# Patient Record
Sex: MALE | Race: WHITE | NOT HISPANIC OR LATINO | URBAN - METROPOLITAN AREA
[De-identification: names, ages, dates, MRNs, and addresses within clinical notes are randomized per-mention and may not be internally consistent; named-entity substitution may affect disease eponyms.]

---

## 2023-10-10 ENCOUNTER — OFFICE VISIT (OUTPATIENT)
Dept: PRIMARY CARE | Facility: CLINIC | Age: 53
End: 2023-10-10
Payer: COMMERCIAL

## 2023-10-10 VITALS
OXYGEN SATURATION: 97 % | HEIGHT: 72 IN | BODY MASS INDEX: 26.28 KG/M2 | DIASTOLIC BLOOD PRESSURE: 80 MMHG | WEIGHT: 194 LBS | HEART RATE: 78 BPM | SYSTOLIC BLOOD PRESSURE: 120 MMHG

## 2023-10-10 DIAGNOSIS — M25.561 CHRONIC PAIN OF RIGHT KNEE: ICD-10-CM

## 2023-10-10 DIAGNOSIS — G89.29 CHRONIC PAIN OF RIGHT KNEE: ICD-10-CM

## 2023-10-10 DIAGNOSIS — Z01.818 PRE-OP EXAMINATION: Primary | ICD-10-CM

## 2023-10-10 DIAGNOSIS — L40.50 ARTHROPATHIC PSORIASIS, UNSPECIFIED (MULTI): ICD-10-CM

## 2023-10-10 PROBLEM — L40.9 PSORIASIS: Status: ACTIVE | Noted: 2023-10-10

## 2023-10-10 PROBLEM — J45.40 MODERATE PERSISTENT ASTHMA WITHOUT COMPLICATION (HHS-HCC): Status: ACTIVE | Noted: 2023-10-10

## 2023-10-10 PROBLEM — G47.33 OBSTRUCTIVE SLEEP APNEA: Status: ACTIVE | Noted: 2023-10-10

## 2023-10-10 PROBLEM — E78.2 HYPERLIPIDEMIA, MIXED: Status: ACTIVE | Noted: 2023-10-10

## 2023-10-10 LAB
NON-UH HIE APPEARANCE, U: CLEAR
NON-UH HIE APTT PATIENT: 35.4 SECONDS (ref 27–38)
NON-UH HIE BILIRUBIN, U: NEGATIVE
NON-UH HIE BLOOD, U: NEGATIVE
NON-UH HIE BUN/CREAT RATIO: 14
NON-UH HIE BUN: 14 MG/DL (ref 9–23)
NON-UH HIE CALCIUM: 9.5 MG/DL (ref 8.7–10.4)
NON-UH HIE CALCULATED OSMOLALITY: 280 MOSM/KG (ref 275–295)
NON-UH HIE CHLORIDE: 102 MMOL/L (ref 98–107)
NON-UH HIE CO2, VENOUS: 32 MMOL/L (ref 20–31)
NON-UH HIE COLOR, U: YELLOW
NON-UH HIE CREATININE: 1 MG/DL (ref 0.6–1.1)
NON-UH HIE GFR AA: >60
NON-UH HIE GLOMERULAR FILTRATION RATE: >60 ML/MIN/1.73M?
NON-UH HIE GLUCOSE QUAL, U: NEGATIVE
NON-UH HIE GLUCOSE: 92 MG/DL (ref 74–106)
NON-UH HIE HCT: 42.3 % (ref 41–52)
NON-UH HIE HGB: 14.3 G/DL (ref 13.5–17.5)
NON-UH HIE INR: 1.1
NON-UH HIE INSTR WBC ND: 8
NON-UH HIE K: 4.5 MMOL/L (ref 3.5–5.1)
NON-UH HIE KETONES, U: NEGATIVE
NON-UH HIE LEUKOCYTE ESTERASE, U: NEGATIVE
NON-UH HIE MCH: 31 PG (ref 27–34)
NON-UH HIE MCHC: 33.8 G/DL (ref 32–37)
NON-UH HIE MCV: 91.8 FL (ref 80–100)
NON-UH HIE MPV: 7.5 FL (ref 7.4–10.4)
NON-UH HIE NA: 140 MMOL/L (ref 135–145)
NON-UH HIE NITRITE, U: NEGATIVE
NON-UH HIE PH, U: 5 (ref 4.5–8)
NON-UH HIE PLATELET: 286 X10 (ref 150–450)
NON-UH HIE PROTEIN, U: NEGATIVE
NON-UH HIE PROTIME PATIENT: 11.9 SECONDS (ref 9.8–12.8)
NON-UH HIE RBC: 4.61 X10 (ref 4.7–6.1)
NON-UH HIE RDW: 12.6 % (ref 11.5–14.5)
NON-UH HIE SPECIFIC GRAVITY, U: 1.02 (ref 1–1.03)
NON-UH HIE SQUAMOUS EPITHELIAL CELLS, U: 1 #/HPF
NON-UH HIE U MICRO: NORMAL
NON-UH HIE UROBILINOGEN QUAL, U: NORMAL
NON-UH HIE WBC: 8 X10 (ref 4.5–11)

## 2023-10-10 PROCEDURE — 93000 ELECTROCARDIOGRAM COMPLETE: CPT | Performed by: FAMILY MEDICINE

## 2023-10-10 PROCEDURE — 1036F TOBACCO NON-USER: CPT | Performed by: FAMILY MEDICINE

## 2023-10-10 PROCEDURE — 99214 OFFICE O/P EST MOD 30 MIN: CPT | Performed by: FAMILY MEDICINE

## 2023-10-10 RX ORDER — HYDROCORTISONE 25 MG/ML
LOTION TOPICAL 2 TIMES DAILY
COMMUNITY
Start: 2019-04-05

## 2023-10-10 RX ORDER — FLUTICASONE PROPIONATE 93 UG/1
SPRAY, METERED NASAL
COMMUNITY

## 2023-10-10 RX ORDER — ALBUTEROL SULFATE 90 UG/1
AEROSOL, METERED RESPIRATORY (INHALATION) EVERY 6 HOURS
COMMUNITY
Start: 2019-01-08

## 2023-10-10 RX ORDER — PANTOPRAZOLE SODIUM 40 MG/1
TABLET, DELAYED RELEASE ORAL EVERY 12 HOURS
COMMUNITY
Start: 2023-10-03

## 2023-10-10 RX ORDER — EPINEPHRINE 0.3 MG/.3ML
INJECTION SUBCUTANEOUS
COMMUNITY

## 2023-10-10 RX ORDER — FLUTICASONE FUROATE AND VILANTEROL TRIFENATATE 100; 25 UG/1; UG/1
POWDER RESPIRATORY (INHALATION)
COMMUNITY
Start: 2019-01-08

## 2023-10-10 RX ORDER — OMALIZUMAB 150 MG/ML
INJECTION, SOLUTION SUBCUTANEOUS
COMMUNITY
Start: 2022-03-01

## 2023-10-10 RX ORDER — RISANKIZUMAB-RZAA 150 MG/ML
INJECTION SUBCUTANEOUS
COMMUNITY
Start: 2022-03-01

## 2023-10-10 RX ORDER — FEXOFENADINE HCL AND PSEUDOEPHEDRINE HCI 60; 120 MG/1; MG/1
1 TABLET, EXTENDED RELEASE ORAL 2 TIMES DAILY
COMMUNITY
Start: 2021-02-18

## 2023-10-10 NOTE — PROGRESS NOTES
Subjective   Patient ID: Kyler Chowdhury is a 53 y.o. male who presents for Pre-op Exam (Pt surgery is right total knee replacement ).    Assessment/Plan     Problem List Items Addressed This Visit       Arthropathic psoriasis, unspecified (CMS/HCC)     Stable on biologic          Other Visit Diagnoses       Pre-op examination    -  Primary    Relevant Orders    CBC    Basic Metabolic Panel    Protime-INR    aPTT    Type and screen    Prepare RBC: 2 Units    ECG 12 lead (Clinic Performed) (Completed)    Urinalysis with Reflex Microscopic    Chronic pain of right knee        Relevant Orders    CBC    Basic Metabolic Panel    Protime-INR    aPTT    Type and screen    Prepare RBC: 2 Units    ECG 12 lead (Clinic Performed) (Completed)        Discontinue anti-inflammatories 7 days before surgery   Continue rest of the medication   EKG today   Patient is at low risk for cardiac complication from noncardiac surgery.  Lab work today  We will clear patient for surgery after reviewing lab work      Previous visit  Discussed about diet exercise  Discussed about tetanus vaccine  Information provided for colonoscopy  Discussed about shingles vaccine  Coronary calcium score requisition provided      HPI  53-year-old male here for preop exam right total knee replacement for osteoarthritis will be done by Dr. Thayer on 10/25    Patient has a family history of DVTs in his father.  Patient recently had a right knee arthrocentesis done by Dr. Cespedes and received steroid injection couple of weeks ago     Hyperlipidemia -diet controlled  Pulmonary function showed small artery disease with significant improvement with bronchodilator and increased nitric oxide level in exhalation suggestive of eosinophilic asthma following up with pulmonary  Treadmill stress test - within normal limits        Patient does have a history of smoking quit in January 2018 and smoked three quarters of pack every day for last 20 years     EGD in June 2018 done  Showed a hiatal hernia with reflux esophagitis with 1.5 cm linear distal esophageal ulcer -   Stopped smoking and drinks alcohol  Lifts heavy weight     Family history is positive for coronary artery disease grandfather around age of 50     Following up with allergy - on xolair and pulmonary for asthma    No Known Allergies    Current Outpatient Medications   Medication Sig Dispense Refill    albuterol (Ventolin HFA) 90 mcg/actuation inhaler every 6 hours.      Breo Ellipta 100-25 mcg/dose inhaler Inhale.      EPINEPHrine 0.3 mg/0.3 mL injection syringe USE AS DIRECTED.      fexofenadine-pseudoephedrine (Allegra-D 12 Hour)  mg 12 hr tablet Take 1 tablet by mouth 2 times a day.      hydrocortisone 2.5 % lotion twice a day.      omalizumab (Xolair) 150 mg/mL injection Inject under the skin.      pantoprazole (ProtoNix) 40 mg EC tablet every 12 hours.      Skyrizi 150 mg/mL pen injector pen Inject under the skin.      Xhance 93 mcg/actuation aerosol breath activated 1 SPRAY(S) 2 TIMES A DAY NASAL       No current facility-administered medications for this visit.       Objective   Visit Vitals  /80 (BP Location: Left arm, Patient Position: Sitting)   Pulse 78   Ht 1.829 m (6')   Wt 88 kg (194 lb)   SpO2 97%   BMI 26.31 kg/m²   Smoking Status Former   BSA 2.11 m²     Physical Exam  Constitutional:       General: He is not in acute distress.     Appearance: Normal appearance.   HENT:      Head: Normocephalic and atraumatic.      Nose: Nose normal.   Eyes:      Extraocular Movements: Extraocular movements intact.      Conjunctiva/sclera: Conjunctivae normal.   Cardiovascular:      Rate and Rhythm: Normal rate and regular rhythm.   Pulmonary:      Effort: Pulmonary effort is normal.      Breath sounds: Normal breath sounds.   Skin:     General: Skin is warm.   Neurological:      Mental Status: He is alert and oriented to person, place, and time.   Psychiatric:         Mood and Affect: Mood normal.          Behavior: Behavior normal.   Immunization History   Administered Date(s) Administered    Pfizer Purple Cap SARS-CoV-2 03/26/2021, 04/16/2021, 12/30/2021    Zoster vaccine, recombinant, adult (SHINGRIX) 04/18/2023, 09/11/2023       Review of Systems    No visits with results within 4 Month(s) from this visit.   Latest known visit with results is:   Legacy Encounter on 10/11/2022   Component Date Value Ref Range Status    Cholesterol 10/11/2022 200 (H)  0 - 199 mg/dL Final    HDL 10/11/2022 82.4  mg/dL Final    Cholesterol/HDL Ratio 10/11/2022 2.4   Final    LDL 10/11/2022 105 (H)  0 - 99 mg/dL Final    VLDL 10/11/2022 13  0 - 40 mg/dL Final    Triglycerides 10/11/2022 63  0 - 149 mg/dL Final    Color, Urine 10/11/2022 YELLOW  STRAW,YELLOW Final    Appearance, Urine 10/11/2022 CLEAR  CLEAR Final    Specific Gravity, Urine 10/11/2022 1.024  1.005 - 1.035 Final    pH, Urine 10/11/2022 5.0  5.0 - 8.0 Final    Protein, Urine 10/11/2022 NEGATIVE  NEGATIVE mg/dL Final    Glucose, Urine 10/11/2022 NEGATIVE  NEGATIVE mg/dL Final    Blood, Urine 10/11/2022 NEGATIVE  NEGATIVE Final    Ketones, Urine 10/11/2022 NEGATIVE  NEGATIVE mg/dL Final    Bilirubin, Urine 10/11/2022 NEGATIVE  NEGATIVE Final    Urobilinogen, Urine 10/11/2022 <2.0  0.0 - 1.9 mg/dL Final    Nitrite, Urine 10/11/2022 NEGATIVE  NEGATIVE Final    Leukocyte Esterase, Urine 10/11/2022 NEGATIVE  NEGATIVE Final    WBC 10/11/2022 6.5  4.4 - 11.3 x10E9/L Final    nRBC 10/11/2022 0.0  0.0 - 0.0 /100 WBC Final    RBC 10/11/2022 4.45 (L)  4.50 - 5.90 x10E12/L Final    Hemoglobin 10/11/2022 13.8  13.5 - 17.5 g/dL Final    Hematocrit 10/11/2022 42.3  41.0 - 52.0 % Final    MCV 10/11/2022 95  80 - 100 fL Final    MCHC 10/11/2022 32.6  32.0 - 36.0 g/dL Final    Platelets 10/11/2022 251  150 - 450 x10E9/L Final    RDW 10/11/2022 12.5  11.5 - 14.5 % Final    Prostate Specific Antigen,Screen 10/11/2022 1.82  0.00 - 4.00 ng/mL Final    Glucose 10/11/2022 78  74 - 99 mg/dL Final     Sodium 10/11/2022 141  136 - 145 mmol/L Final    Potassium 10/11/2022 3.9  3.5 - 5.3 mmol/L Final    Chloride 10/11/2022 103  98 - 107 mmol/L Final    Bicarbonate 10/11/2022 24  21 - 32 mmol/L Final    Anion Gap 10/11/2022 18  10 - 20 mmol/L Final    Urea Nitrogen 10/11/2022 20  6 - 23 mg/dL Final    Creatinine 10/11/2022 1.01  0.50 - 1.30 mg/dL Final    GFR MALE 10/11/2022 >90  >90 mL/min/1.73m2 Final    Calcium 10/11/2022 9.6  8.6 - 10.6 mg/dL Final    Albumin 10/11/2022 4.5  3.4 - 5.0 g/dL Final    Alkaline Phosphatase 10/11/2022 51  33 - 120 U/L Final    Total Protein 10/11/2022 7.1  6.4 - 8.2 g/dL Final    AST 10/11/2022 21  9 - 39 U/L Final    Total Bilirubin 10/11/2022 0.7  0.0 - 1.2 mg/dL Final    ALT (SGPT) 10/11/2022 18  10 - 52 U/L Final    TSH 10/11/2022 1.20  0.44 - 3.98 mIU/L Final       Radiology: Reviewed imaging in powerchart.  No results found.    No family history on file.  Social History     Socioeconomic History    Marital status:      Spouse name: None    Number of children: None    Years of education: None    Highest education level: None   Occupational History    None   Tobacco Use    Smoking status: Former     Packs/day: 1.00     Years: 20.00     Additional pack years: 0.00     Total pack years: 20.00     Types: Cigarettes    Smokeless tobacco: Never   Substance and Sexual Activity    Alcohol use: Yes     Alcohol/week: 6.0 standard drinks of alcohol     Types: 6 Cans of beer per week    Drug use: Never    Sexual activity: None   Other Topics Concern    None   Social History Narrative    None     Social Determinants of Health     Financial Resource Strain: Not on file   Food Insecurity: Not on file   Transportation Needs: Not on file   Physical Activity: Not on file   Stress: Not on file   Social Connections: Not on file   Intimate Partner Violence: Not on file   Housing Stability: Not on file     Past Medical History:   Diagnosis Date    Personal history of other diseases of the  circulatory system     History of cardiac murmur     Past Surgical History:   Procedure Laterality Date    KNEE SURGERY  08/01/2017    Knee Surgery Left    KNEE SURGERY  08/01/2017    Knee Surgery Right    VASECTOMY  08/01/2017    Surgery Vas Deferens Vasectomy       Charting was completed using voice recognition technology and may include unintended errors.

## 2023-10-17 ENCOUNTER — OFFICE VISIT (OUTPATIENT)
Dept: PRIMARY CARE | Facility: CLINIC | Age: 53
End: 2023-10-17
Payer: COMMERCIAL

## 2023-10-17 ENCOUNTER — LAB (OUTPATIENT)
Dept: LAB | Facility: LAB | Age: 53
End: 2023-10-17
Payer: COMMERCIAL

## 2023-10-17 VITALS
WEIGHT: 195 LBS | DIASTOLIC BLOOD PRESSURE: 78 MMHG | TEMPERATURE: 97.2 F | HEIGHT: 72 IN | OXYGEN SATURATION: 98 % | SYSTOLIC BLOOD PRESSURE: 115 MMHG | HEART RATE: 69 BPM | BODY MASS INDEX: 26.41 KG/M2

## 2023-10-17 DIAGNOSIS — Z00.00 VISIT FOR PREVENTIVE HEALTH EXAMINATION: ICD-10-CM

## 2023-10-17 DIAGNOSIS — Z00.00 VISIT FOR PREVENTIVE HEALTH EXAMINATION: Primary | ICD-10-CM

## 2023-10-17 LAB
ALBUMIN SERPL BCP-MCNC: 4.4 G/DL (ref 3.4–5)
ALP SERPL-CCNC: 56 U/L (ref 33–120)
ALT SERPL W P-5'-P-CCNC: 17 U/L (ref 10–52)
AST SERPL W P-5'-P-CCNC: 16 U/L (ref 9–39)
BILIRUB DIRECT SERPL-MCNC: 0.1 MG/DL (ref 0–0.3)
BILIRUB SERPL-MCNC: 0.4 MG/DL (ref 0–1.2)
CHOLEST SERPL-MCNC: 210 MG/DL (ref 0–199)
CHOLESTEROL/HDL RATIO: 3.8
HDLC SERPL-MCNC: 55.2 MG/DL
LDLC SERPL CALC-MCNC: 134 MG/DL
NON HDL CHOLESTEROL: 155 MG/DL (ref 0–149)
PROT SERPL-MCNC: 7.2 G/DL (ref 6.4–8.2)
PSA SERPL-MCNC: 1.94 NG/ML
TRIGL SERPL-MCNC: 104 MG/DL (ref 0–149)
TSH SERPL-ACNC: 2.69 MIU/L (ref 0.44–3.98)
VLDL: 21 MG/DL (ref 0–40)

## 2023-10-17 PROCEDURE — 80061 LIPID PANEL: CPT

## 2023-10-17 PROCEDURE — 90682 RIV4 VACC RECOMBINANT DNA IM: CPT | Performed by: FAMILY MEDICINE

## 2023-10-17 PROCEDURE — 36415 COLL VENOUS BLD VENIPUNCTURE: CPT

## 2023-10-17 PROCEDURE — 1036F TOBACCO NON-USER: CPT | Performed by: FAMILY MEDICINE

## 2023-10-17 PROCEDURE — 99396 PREV VISIT EST AGE 40-64: CPT | Performed by: FAMILY MEDICINE

## 2023-10-17 PROCEDURE — 90471 IMMUNIZATION ADMIN: CPT | Performed by: FAMILY MEDICINE

## 2023-10-17 PROCEDURE — 84153 ASSAY OF PSA TOTAL: CPT

## 2023-10-17 PROCEDURE — 80076 HEPATIC FUNCTION PANEL: CPT

## 2023-10-17 PROCEDURE — 84443 ASSAY THYROID STIM HORMONE: CPT

## 2023-10-17 NOTE — PROGRESS NOTES
Subjective   Patient ID: Kyler Chowdhury is a 53 y.o. male who presents for Annual Exam.    Assessment/Plan     Problem List Items Addressed This Visit    None  Visit Diagnoses       Visit for preventive health examination    -  Primary    Relevant Orders    Flu vaccine, quadrivalent, recombinant, preservative free, adult (FLUBLOK)    Prostate Specific Antigen, Screen    TSH with reflex to Free T4 if abnormal    Lipid Panel    Hepatic function panel          Discussed about diet exercise  Discussed about tetanus vaccine  Colonoscopy done- 1/23  Received Shingrix received flu vaccine  Coronary calcium score-16.88 in 12/22  Follow-up in 6 to 12 months  Come back early with any new signs and symptoms  Patient understands and agreement with plan.    HPI  53-year-old male here for physical     Patient will be having right total knee replacement for osteoarthritis will be done by Dr. Thayer on 10/25    Patient has a family history of DVTs in his father.  Patient recently had a right knee arthrocentesis done by Dr. Cespedes and received steroid injection couple of weeks ago     Hyperlipidemia -diet controlled  Pulmonary function showed small artery disease with significant improvement with bronchodilator and increased nitric oxide level in exhalation suggestive of eosinophilic asthma following up with pulmonary  Treadmill stress test - within normal limits        Patient does have a history of smoking quit in January 2018 and smoked three quarters of pack every day for last 20 years     EGD in June 2018 done Showed a hiatal hernia with reflux esophagitis with 1.5 cm linear distal esophageal ulcer - taking PPI as needed visits  Stopped smoking 6 years ago and drinks alcohol  Patient was a rare smoker  Lifts heavy weight     Family history is positive for coronary artery disease grandfather around age of 50     Following up with allergy - on xolair and pulmonary for asthma        No Known Allergies    Current Outpatient  Medications   Medication Sig Dispense Refill    albuterol (Ventolin HFA) 90 mcg/actuation inhaler every 6 hours.      Breo Ellipta 100-25 mcg/dose inhaler Inhale.      EPINEPHrine 0.3 mg/0.3 mL injection syringe USE AS DIRECTED.      fexofenadine-pseudoephedrine (Allegra-D 12 Hour)  mg 12 hr tablet Take 1 tablet by mouth 2 times a day.      hydrocortisone 2.5 % lotion twice a day.      omalizumab (Xolair) 150 mg/mL injection Inject under the skin.      pantoprazole (ProtoNix) 40 mg EC tablet every 12 hours.      Skyrizi 150 mg/mL pen injector pen Inject under the skin.      Xhance 93 mcg/actuation aerosol breath activated 1 SPRAY(S) 2 TIMES A DAY NASAL       No current facility-administered medications for this visit.       Objective   Visit Vitals  /78 (BP Location: Left arm, Patient Position: Sitting)   Pulse 69   Temp 36.2 °C (97.2 °F)   Ht 1.829 m (6')   Wt 88.5 kg (195 lb)   SpO2 98%   BMI 26.45 kg/m²   Smoking Status Former   BSA 2.12 m²     Physical Exam    Constitutional   General appearance: Alert and in no acute distress.   Head and Face   Head and face: Normal.     Palpation of the face and sinuses: Normal.    Eyes   Inspection of eyes: Sclera and conjunctiva were normal.    Pupil exam: Pupils were equal in size. Extraocular movements were intact.   Ears, Nose, Mouth, and Throat   Ears: Auricles: Normal.    Otoscopic examination: Tympanic membranes: Normal with no congestion and no discharge. Otic Canals: Normal without tenderness, congestion or discharge.    Hearing: Normal.     Nasal mucosa, septum, and turbinates: Normal without edema or erythema.    Lips, teeth, and gums: Normal.    Oropharynx: Normal with moist mucus membranes, no congestion. Tonsils: Normal no follicles.   Neck   Neck Exam: Appearance of the neck was normal. No neck masses observed.    Thyroid exam: Not enlarged and no palpable thyroid nodules.   Pulmonary   Respiratory assessment: No respiratory distress, normal  respiratory rhythm and effort.    Auscultation of Lungs: Clear bilateral breath sounds.   Cardiovascular   Auscultation of heart: Apical pulse normal, heart rate and rhythm normal, normal S1 and S2, no murmurs and no pericardial rub.    Carotid arteries: Pulses normal with no bruits.    Exam for edema: No peripheral edema.   Chest   Chest: Normal A_P diameter, no pulsation, no intercostal withdrawing. Trachea central.   Abdomen   Abdominal Exam: No bruits, normal bowel sounds, soft, non-tender, no abdominal mass palpated.    Liver and Spleen exam: No hepato-splenomegaly.    Examination for hernias: Normal.    Lymphatic   Palpation of lymph nodes in neck: No cervical lymphadenopathy.   Musculoskeletal   Examination of gait: Normal.    Inspection of digits and nails: No clubbing or cyanosis of the fingernails.    Inspection/palpation of joints, bones and muscles: No joint swelling. Normal movement of all extremities.    Range of Motion: Normal movement of all extremities.   Skin   Skin inspection: Normal skin color and pigmentation, normal skin turgor and no visible rash.   Neurologic   Cranial nerves: Nerves 2-12 were intact, no focal neuro defects.    Reflexes: Normal.     Sensation: Normal.     Coordination: Normal.    Psychiatric   Judgment and insight: Intact.    Orientation: Oriented to person, place, and time.    Recent and remote memory: Normal.     Mood and affect: Normal.     Genitourinary Declined.     Immunization History   Administered Date(s) Administered    Pfizer Purple Cap SARS-CoV-2 03/26/2021, 04/16/2021, 12/30/2021    Zoster vaccine, recombinant, adult (SHINGRIX) 04/18/2023, 09/11/2023       Review of Systems    Orders Only on 10/10/2023   Component Date Value Ref Range Status    [facility] APTT Patient 10/10/2023 35.4  27.0 - 38.0 seconds Final    [facility] INR 10/10/2023 1.1   Final    [facility] Protime Patient 10/10/2023 11.9  9.8 - 12.8 seconds Final    [facility] MCH 10/10/2023 31.0  27.0 -  34.0 pg Final    [facility] HCT 10/10/2023 42.3  41.0 - 52.0 % Final    [facility] Platelet 10/10/2023 286  150 - 450 x10 Final    [facility] RBC 10/10/2023 4.61 (L)  4.70 - 6.10 x10 Final    [facility] MCHC 10/10/2023 33.8  32.0 - 37.0 g/dL Final    [facility] Instr WBC ND 10/10/2023 8.0   Final    [facility] MCV 10/10/2023 91.8  80.0 - 100.0 fL Final    [facility] HGB 10/10/2023 14.3  13.5 - 17.5 g/dL Final    [facility] MPV 10/10/2023 7.5  7.4 - 10.4 fL Final    [facility] WBC 10/10/2023 8.0  4.5 - 11.0 x10 Final    [facility] RDW 10/10/2023 12.6  11.5 - 14.5 % Final    [facility] Ketones, U 10/10/2023 Negative  Negative Final    [facility] Protein, U 10/10/2023 Negative  Negative Final    [facility] Color, U 10/10/2023 Yellow   Final    [facility] U MICRO 10/10/2023 Not Indicated   Final    [facility] Nitrite, U 10/10/2023 Negative  Negative Final    [facility] pH, U 10/10/2023 5.0  4.5 - 8.0 Final    [facility] Squamous Epithelial Addie* 10/10/2023 1  #/HPF Final    [facility] Bilirubin, U 10/10/2023 Negative  Negative Final    [facility] Leukocyte Esterase, U 10/10/2023 Negative  Negative Final    [facility] Blood, U 10/10/2023 Negative  Negative Final    [facility] Glucose Qual, U 10/10/2023 Negative  Negative Final    [facility] Specific Gravity, U 10/10/2023 1.023  1.001 - 1.035 Final    [facility] Urobilinogen Qual, U 10/10/2023 <2.0 mg/dl  <2.0 mg/dl Final    [facility] Appearance, U 10/10/2023 Clear   Final    [facility] Glomerular Filtration R* 10/10/2023 >60  mL/min/1.73m? Final    [facility] Chloride 10/10/2023 102  98 - 107 mmol/L Final    [facility] BUN/Creat Ratio 10/10/2023 14.0   Final    [facility] Na 10/10/2023 140  135 - 145 mmol/L Final    [facility] Creatinine 10/10/2023 1.0  0.6 - 1.1 mg/dL Final    [facility] GFR AA 10/10/2023 >60   Final    [facility] CO2, venous 10/10/2023 32.0 (H)  20.0 - 31.0 mmol/L Final    [facility] Calculated Osmolality 10/10/2023 280  275 - 295 mOsm/kg Final     [facility] K 10/10/2023 4.5  3.5 - 5.1 mmol/L Final    [facility] BUN 10/10/2023 14  9 - 23 mg/dL Final    [facility] Calcium 10/10/2023 9.5  8.7 - 10.4 mg/dL Final    [facility] Glucose 10/10/2023 92  74 - 106 mg/dL Final       Radiology: Reviewed imaging in powerchart.  No results found.    No family history on file.  Social History     Socioeconomic History    Marital status:      Spouse name: None    Number of children: None    Years of education: None    Highest education level: None   Occupational History    None   Tobacco Use    Smoking status: Former     Packs/day: 1.00     Years: 20.00     Additional pack years: 0.00     Total pack years: 20.00     Types: Cigarettes    Smokeless tobacco: Never   Substance and Sexual Activity    Alcohol use: Yes     Alcohol/week: 6.0 standard drinks of alcohol     Types: 6 Cans of beer per week    Drug use: Never    Sexual activity: None   Other Topics Concern    None   Social History Narrative    None     Social Determinants of Health     Financial Resource Strain: Not on file   Food Insecurity: Not on file   Transportation Needs: Not on file   Physical Activity: Not on file   Stress: Not on file   Social Connections: Not on file   Intimate Partner Violence: Not on file   Housing Stability: Not on file     Past Medical History:   Diagnosis Date    Personal history of other diseases of the circulatory system     History of cardiac murmur     Past Surgical History:   Procedure Laterality Date    KNEE SURGERY  08/01/2017    Knee Surgery Left    KNEE SURGERY  08/01/2017    Knee Surgery Right    VASECTOMY  08/01/2017    Surgery Vas Deferens Vasectomy       Charting was completed using voice recognition technology and may include unintended errors.

## 2024-02-20 ENCOUNTER — LAB (OUTPATIENT)
Dept: LAB | Facility: LAB | Age: 54
End: 2024-02-20
Payer: COMMERCIAL

## 2024-02-20 ENCOUNTER — OFFICE VISIT (OUTPATIENT)
Dept: PRIMARY CARE | Facility: CLINIC | Age: 54
End: 2024-02-20
Payer: COMMERCIAL

## 2024-02-20 VITALS
OXYGEN SATURATION: 95 % | DIASTOLIC BLOOD PRESSURE: 88 MMHG | BODY MASS INDEX: 27.09 KG/M2 | WEIGHT: 200 LBS | TEMPERATURE: 97.9 F | SYSTOLIC BLOOD PRESSURE: 128 MMHG | HEART RATE: 74 BPM | HEIGHT: 72 IN

## 2024-02-20 DIAGNOSIS — M19.049 HAND ARTHROPATHY: Primary | ICD-10-CM

## 2024-02-20 DIAGNOSIS — M19.049 HAND ARTHROPATHY: ICD-10-CM

## 2024-02-20 DIAGNOSIS — M19.049 HAND ARTHRITIS: ICD-10-CM

## 2024-02-20 LAB — ERYTHROCYTE [SEDIMENTATION RATE] IN BLOOD BY WESTERGREN METHOD: 26 MM/H (ref 0–20)

## 2024-02-20 PROCEDURE — 99213 OFFICE O/P EST LOW 20 MIN: CPT | Performed by: FAMILY MEDICINE

## 2024-02-20 PROCEDURE — 84550 ASSAY OF BLOOD/URIC ACID: CPT

## 2024-02-20 PROCEDURE — 86431 RHEUMATOID FACTOR QUANT: CPT

## 2024-02-20 PROCEDURE — 86200 CCP ANTIBODY: CPT

## 2024-02-20 PROCEDURE — 1036F TOBACCO NON-USER: CPT | Performed by: FAMILY MEDICINE

## 2024-02-20 PROCEDURE — 85652 RBC SED RATE AUTOMATED: CPT

## 2024-02-20 PROCEDURE — 86038 ANTINUCLEAR ANTIBODIES: CPT

## 2024-02-20 PROCEDURE — 36415 COLL VENOUS BLD VENIPUNCTURE: CPT

## 2024-02-20 NOTE — PROGRESS NOTES
Subjective   Patient ID: Kyler Chowdhury is a 53 y.o. male who presents for Joint Pain (Both shoulders, both hands ).    Assessment/Plan     Problem List Items Addressed This Visit    None  Visit Diagnoses       Hand arthropathy    -  Primary    Relevant Orders    XR hand 1-2 views bilateral    Sedimentation Rate    CINDY with Reflex to ZARINA    Uric acid    Rheumatoid Factor    Citrulline Antibody, IgG    Hand arthritis        Relevant Orders    XR hand 1-2 views bilateral        Discussed about diet exercise  Discussed about tetanus vaccine  Colonoscopy done- 1/23  Received Shingrix received flu vaccine  Coronary calcium score-16.88 in 12/22  Follow-up in 6 to 12 months  Come back early with any new signs and symptoms  Patient understands and agreement with plan.    HPI  53-year-old male here for     Complaining of bilateral shoulder pain hand pain started since last 1 month patient said he was on Xolair couple of months ago and it was stopped switched to Skyrizi  Will consider workup for autoimmune disease with x-ray and labs  Call patient abnormal result patient states he was put on prednisone by orthopedic surgery for left shoulder pain had x-ray done showed mild arthritis  Prednisone did help  But symptoms returned    Patient will be seen by ENT may be starting him on Dupixent discussed with patient regarding use of Dupixent and Skyrizi.  Patient understood and he will talk with ENT regarding taking 1 immunosuppressant    Complaining complaining of bilateral hand stiffness unable to make a fist    Previous visit    Patient will be having right total knee replacement for osteoarthritis will be done by Dr. Thayer on 10/25    Patient has a family history of DVTs in his father.  Patient recently had a right knee arthrocentesis done by Dr. Cespedes and received steroid injection couple of weeks ago     Hyperlipidemia -diet controlled  Pulmonary function showed small artery disease with significant improvement with  bronchodilator and increased nitric oxide level in exhalation suggestive of eosinophilic asthma following up with pulmonary  Treadmill stress test - within normal limits        Patient does have a history of smoking quit in January 2018 and smoked three quarters of pack every day for last 20 years     EGD in June 2018 done Showed a hiatal hernia with reflux esophagitis with 1.5 cm linear distal esophageal ulcer - taking PPI as needed visits  Stopped smoking 6 years ago and drinks alcohol  Patient was a rare smoker  Lifts heavy weight     Family history is positive for coronary artery disease grandfather around age of 50     Following up with allergy - on xolair and pulmonary for asthma          No Known Allergies    Current Outpatient Medications   Medication Sig Dispense Refill    albuterol (Ventolin HFA) 90 mcg/actuation inhaler every 6 hours.      Breo Ellipta 100-25 mcg/dose inhaler Inhale.      EPINEPHrine 0.3 mg/0.3 mL injection syringe USE AS DIRECTED.      fexofenadine-pseudoephedrine (Allegra-D 12 Hour)  mg 12 hr tablet Take 1 tablet by mouth 2 times a day.      pantoprazole (ProtoNix) 40 mg EC tablet every 12 hours.      Skyrizi 150 mg/mL pen injector pen Inject under the skin.      Xhance 93 mcg/actuation aerosol breath activated 1 SPRAY(S) 2 TIMES A DAY NASAL      hydrocortisone 2.5 % lotion twice a day.      omalizumab (Xolair) 150 mg/mL injection Inject under the skin.       No current facility-administered medications for this visit.       Objective   Visit Vitals  /88 (BP Location: Left arm, Patient Position: Sitting)   Pulse 74   Temp 36.6 °C (97.9 °F)   Ht 1.829 m (6')   Wt 90.7 kg (200 lb)   SpO2 95%   BMI 27.12 kg/m²   Smoking Status Former   BSA 2.15 m²     Physical Exam    Constitutional:       General: He is not in acute distress.     Appearance: Normal appearance.   HENT:      Head: Normocephalic and atraumatic.      Nose: Nose normal.   Eyes:      Extraocular Movements:  Extraocular movements intact.      Conjunctiva/sclera: Conjunctivae normal.   Cardiovascular:      Rate and Rhythm: Normal rate and regular rhythm.   Pulmonary:      Effort: Pulmonary effort is normal.      Breath sounds: Normal breath sounds.   Skin:     General: Skin is warm.   Neurological:      Mental Status: He is alert and oriented to person, place, and time.   Psychiatric:         Mood and Affect: Mood normal.         Behavior: Behavior normal.            Immunization History   Administered Date(s) Administered    Flu vaccine, quadrivalent, recombinant, preservative free, adult (FLUBLOK) 10/17/2023    Pfizer Purple Cap SARS-CoV-2 03/26/2021, 04/16/2021, 12/30/2021    Zoster vaccine, recombinant, adult (SHINGRIX) 04/18/2023, 09/11/2023       Review of Systems    No visits with results within 4 Month(s) from this visit.   Latest known visit with results is:   Lab on 10/17/2023   Component Date Value Ref Range Status    Prostate Specific Antigen,Screen 10/17/2023 1.94  <=4.00 ng/mL Final    Thyroid Stimulating Hormone 10/17/2023 2.69  0.44 - 3.98 mIU/L Final    Cholesterol 10/17/2023 210 (H)  0 - 199 mg/dL Final    HDL-Cholesterol 10/17/2023 55.2  mg/dL Final    Cholesterol/HDL Ratio 10/17/2023 3.8   Final    LDL Calculated 10/17/2023 134 (H)  <=99 mg/dL Final    VLDL 10/17/2023 21  0 - 40 mg/dL Final    Triglycerides 10/17/2023 104  0 - 149 mg/dL Final    Non HDL Cholesterol 10/17/2023 155 (H)  0 - 149 mg/dL Final    Albumin 10/17/2023 4.4  3.4 - 5.0 g/dL Final    Bilirubin, Total 10/17/2023 0.4  0.0 - 1.2 mg/dL Final    Bilirubin, Direct 10/17/2023 0.1  0.0 - 0.3 mg/dL Final    Alkaline Phosphatase 10/17/2023 56  33 - 120 U/L Final    ALT 10/17/2023 17  10 - 52 U/L Final    AST 10/17/2023 16  9 - 39 U/L Final    Total Protein 10/17/2023 7.2  6.4 - 8.2 g/dL Final       Radiology: Reviewed imaging in powerchart.  No results found.    No family history on file.  Social History     Socioeconomic History    Marital  status:      Spouse name: None    Number of children: None    Years of education: None    Highest education level: None   Occupational History    None   Tobacco Use    Smoking status: Former     Packs/day: 1.00     Years: 20.00     Additional pack years: 0.00     Total pack years: 20.00     Types: Cigarettes    Smokeless tobacco: Never   Substance and Sexual Activity    Alcohol use: Yes     Alcohol/week: 6.0 standard drinks of alcohol     Types: 6 Cans of beer per week    Drug use: Never    Sexual activity: None   Other Topics Concern    None   Social History Narrative    None     Social Determinants of Health     Financial Resource Strain: Not on file   Food Insecurity: Not on file   Transportation Needs: Not on file   Physical Activity: Not on file   Stress: Not on file   Social Connections: Not on file   Intimate Partner Violence: Not on file   Housing Stability: Not on file     Past Medical History:   Diagnosis Date    Personal history of other diseases of the circulatory system     History of cardiac murmur     Past Surgical History:   Procedure Laterality Date    KNEE SURGERY  08/01/2017    Knee Surgery Left    KNEE SURGERY  08/01/2017    Knee Surgery Right    VASECTOMY  08/01/2017    Surgery Vas Deferens Vasectomy       Charting was completed using voice recognition technology and may include unintended errors.

## 2024-02-21 LAB
ANA SER QL HEP2 SUBST: NEGATIVE
CCP IGG SERPL-ACNC: <1 U/ML
RHEUMATOID FACT SER NEPH-ACNC: <10 IU/ML (ref 0–15)
URATE SERPL-MCNC: 7.6 MG/DL (ref 4–7.5)

## 2024-02-22 ENCOUNTER — TELEPHONE (OUTPATIENT)
Dept: PRIMARY CARE | Facility: CLINIC | Age: 54
End: 2024-02-22
Payer: COMMERCIAL

## 2024-02-22 DIAGNOSIS — E79.0 ELEVATED URIC ACID IN BLOOD: ICD-10-CM

## 2024-02-22 RX ORDER — ALLOPURINOL 100 MG/1
100 TABLET ORAL DAILY
Qty: 30 TABLET | Refills: 0 | Status: SHIPPED | OUTPATIENT
Start: 2024-02-22 | End: 2025-02-21

## 2024-02-22 NOTE — TELEPHONE ENCOUNTER
----- Message from Christiano Joy MD sent at 2/22/2024 11:25 AM EST -----  Please call the patient regarding his abnormal result.  Please call patient that lab work did not show any acute abnormality except elevated uric acid level.  Patient can try allopurinol 100 mg daily and see whether it helps with joints.  If not then we will have him see rheumatology

## 2024-02-27 ENCOUNTER — TELEPHONE (OUTPATIENT)
Dept: PRIMARY CARE | Facility: CLINIC | Age: 54
End: 2024-02-27
Payer: COMMERCIAL

## 2024-02-27 NOTE — TELEPHONE ENCOUNTER
----- Message from Christiano Joy MD sent at 2/26/2024  3:03 PM EST -----  Please call the patient regarding his abnormal result.  Please call patient that the x-ray and lab work did not reveal any acute abnormality including erosions mainly mild to moderate degenerative changes there is age-related arthritis.  If patient still wants to see he can see rheumatology Dr. Rodriguez

## 2024-06-18 ENCOUNTER — APPOINTMENT (OUTPATIENT)
Dept: NEUROLOGY | Facility: CLINIC | Age: 54
End: 2024-06-18
Payer: COMMERCIAL

## 2024-06-18 VITALS
HEART RATE: 75 BPM | HEIGHT: 72 IN | SYSTOLIC BLOOD PRESSURE: 132 MMHG | WEIGHT: 199.4 LBS | DIASTOLIC BLOOD PRESSURE: 80 MMHG | BODY MASS INDEX: 27.01 KG/M2

## 2024-06-18 DIAGNOSIS — G56.03 CARPAL TUNNEL SYNDROME ON BOTH SIDES: Primary | ICD-10-CM

## 2024-06-18 PROCEDURE — 99204 OFFICE O/P NEW MOD 45 MIN: CPT | Performed by: STUDENT IN AN ORGANIZED HEALTH CARE EDUCATION/TRAINING PROGRAM

## 2024-06-18 PROCEDURE — 1036F TOBACCO NON-USER: CPT | Performed by: STUDENT IN AN ORGANIZED HEALTH CARE EDUCATION/TRAINING PROGRAM

## 2024-06-18 ASSESSMENT — PATIENT HEALTH QUESTIONNAIRE - PHQ9
1. LITTLE INTEREST OR PLEASURE IN DOING THINGS: NOT AT ALL
2. FEELING DOWN, DEPRESSED OR HOPELESS: NOT AT ALL
SUM OF ALL RESPONSES TO PHQ9 QUESTIONS 1 & 2: 0

## 2024-06-18 ASSESSMENT — PAIN SCALES - GENERAL: PAINLEVEL: 8

## 2024-06-18 NOTE — PROGRESS NOTES
Subjective     Kyler Chowdhury is a right handed  53 y.o. year old male who presents with Shoulder Pain and Hand Pain (Left arm.).   Visit type: new patient visit     4 months ago he developed intermittent achy pain of bilateral, L>R shoulders, which will occassionally radiate to his left bicep. Independently of this but around the same time he developed pain in his hands which feels like throbbing in digits 1-4. He does sometimes wake up with paresthesias in those distributions. He has paresthesias in the same distributions. He has not had muscle weakness but has had to limit workouts because of pain. No gait problems, lower extremity symptoms, or bladder problems. He has had some difficulty putting on buttons and fine movements of hands but has not affected his job. He had a carpal tunnel release in LH 2 weeks ago, some numbness has improved since then. He is pending RH CTS next month.    He is working in a factory.    Patient Active Problem List   Diagnosis    Hyperlipidemia, mixed    Moderate persistent asthma without complication ([facility])    Psoriasis    Obstructive sleep apnea    Arthropathic psoriasis, unspecified ([facility])      Past Medical History:   Diagnosis Date    Personal history of other diseases of the circulatory system     History of cardiac murmur      Past Surgical History:   Procedure Laterality Date    KNEE SURGERY  08/01/2017    Knee Surgery Left    KNEE SURGERY  08/01/2017    Knee Surgery Right    VASECTOMY  08/01/2017    Surgery Vas Deferens Vasectomy      Social History     Socioeconomic History    Marital status:      Spouse name: Not on file    Number of children: Not on file    Years of education: Not on file    Highest education level: Not on file   Occupational History    Not on file   Tobacco Use    Smoking status: Former     Current packs/day: 1.00     Average packs/day: 1 pack/day for 20.0 years (20.0 ttl pk-yrs)     Types: Cigarettes    Smokeless tobacco: Never   Vaping Use     Vaping status: Never Used   Substance and Sexual Activity    Alcohol use: Yes     Alcohol/week: 6.0 standard drinks of alcohol     Types: 6 Cans of beer per week    Drug use: Never    Sexual activity: Defer   Other Topics Concern    Not on file   Social History Narrative    Not on file     Social Determinants of Health     Financial Resource Strain: Not on file   Food Insecurity: Not on file   Transportation Needs: Not on file   Physical Activity: Not on file   Stress: Not on file   Social Connections: Not on file   Intimate Partner Violence: Not on file   Housing Stability: Not on file      No family history on file.   Patient Health Questionnaire-2 Score: 0          Review of Systems  All other system have been reviewed and are negative for complaint.    Vitals:    06/18/24 0901   BP: 132/80   BP Location: Right arm   Patient Position: Sitting   BP Cuff Size: Large adult   Pulse: 75   Weight: 90.4 kg (199 lb 6.4 oz)   Height: 1.829 m (6')     Objective   Neurological Exam  Mental Status  Awake, alert and oriented to person, place and time. Speech is normal. Language is fluent with no aphasia. Attention and concentration are normal.    Cranial Nerves  CN II: Visual fields full to confrontation.  CN III, IV, VI: Extraocular movements intact bilaterally. Normal saccades. Normal smooth pursuit. Normal lids and orbits bilaterally. Pupils equal round and reactive to light bilaterally.  CN V:  Right: Facial sensation is normal.  Left: Facial sensation is normal on the left.  CN VII: Full and symmetric facial movement.  CN VIII: Hearing is normal.  CN IX, X: Palate elevates symmetrically  CN XI: Shoulder shrug strength is normal.  CN XII: Tongue midline without atrophy or fasciculations.    Motor  Normal muscle bulk throughout. No fasciculations present. Normal muscle tone. No abnormal involuntary movements.                                               Right                     Left   Shoulder abduction               5                           5  Elbow flexion                         5                          5  Elbow extension                    5                          5  Finger flexion                         5                          5  Finger extension                    5                          5  Finger abduction                    5                          5  Thumb abduction                   5                          5  Hip flexion                              5                          5  Knee flexion                           5                          5  Plantarflexion                         5                          5  Dorsiflexion                            5                          5    Sensory  Light touch is normal in upper and lower extremities. Reduced pinprick over right hand digits 1-3, with splitting of 4th digit. Intact in LUE. Normal vibration in distal lower extremities.     Reflexes                                            Right                      Left  Biceps                                 2+                         2+  Triceps                                2+                         2+  Patellar                                2+                         2+  Achilles                                2+                         2+  Right Plantar: downgoing  Left Plantar: downgoing    Right pathological reflexes: Grayson's absent.  Left pathological reflexes: Grayson's absent.    Coordination  Right: Finger-to-nose normal.Left: Finger-to-nose normal.    Gait  Casual gait is normal including stance, stride, and arm swing. Normal tandem gait. Romberg is absent.  Bilateral shoulder pain with passive rotation of both shoulders                        Thyroid Stimulating Hormone   Date Value Ref Range Status   10/17/2023 2.69 0.44 - 3.98 mIU/L Final     Imaging Results: EMG & nerve conduction reviewed      Assessment/Plan   Problem List Items Addressed This Visit             ICD-10-CM    Carpal  tunnel syndrome on both sides - Primary G56.03       Kyler Chowdhury is a 53 y.o. year old male with recent left carpal tunnel release here for evaluation of numbness and paresthesia in hands for past 4 months. I suspect bilateral median neuropathy and he is pending surgery on right wrist. His shoulder symptoms seem to be musculoskeletal.  We discussed the following plan today:   Wear a wrist splint neutral position on the right hand only when sleeping  In a few weeks if the left hand symptoms are no better you may also consider splinting left hand at night.  Return as needed

## 2024-06-18 NOTE — PATIENT INSTRUCTIONS
Thank you for your visit today. You were seen by Dr. Wong for carpal tunnel syndrome both sides. If you have any questions or need to reach me, call my office at [phone].    We discussed the following plan today:   Wear a wrist splint neutral position on the right hand only when sleeping  In a few weeks if the left hand symptoms are no better you may also consider splinting left hand at night.  Return as needed

## 2024-07-24 ENCOUNTER — APPOINTMENT (OUTPATIENT)
Dept: NEUROLOGY | Facility: CLINIC | Age: 54
End: 2024-07-24
Payer: COMMERCIAL

## 2024-09-23 ASSESSMENT — PROMIS GLOBAL HEALTH SCALE
CARRYOUT_PHYSICAL_ACTIVITIES: COMPLETELY
RATE_MENTAL_HEALTH: GOOD
RATE_QUALITY_OF_LIFE: GOOD
RATE_GENERAL_HEALTH: GOOD
RATE_PHYSICAL_HEALTH: GOOD
RATE_AVERAGE_PAIN: 2
RATE_SOCIAL_SATISFACTION: VERY GOOD
EMOTIONAL_PROBLEMS: SOMETIMES
CARRYOUT_SOCIAL_ACTIVITIES: VERY GOOD
RATE_AVERAGE_FATIGUE: MILD

## 2024-09-26 ENCOUNTER — APPOINTMENT (OUTPATIENT)
Dept: PRIMARY CARE | Facility: CLINIC | Age: 54
End: 2024-09-26
Payer: COMMERCIAL

## 2024-09-26 VITALS
HEIGHT: 72 IN | DIASTOLIC BLOOD PRESSURE: 70 MMHG | BODY MASS INDEX: 26.41 KG/M2 | OXYGEN SATURATION: 95 % | SYSTOLIC BLOOD PRESSURE: 126 MMHG | HEART RATE: 73 BPM | WEIGHT: 195 LBS

## 2024-09-26 DIAGNOSIS — Z00.00 VISIT FOR PREVENTIVE HEALTH EXAMINATION: Primary | ICD-10-CM

## 2024-09-26 LAB
NON-UH HIE APPEARANCE, U: CLEAR
NON-UH HIE BILIRUBIN, U: NEGATIVE
NON-UH HIE BLOOD, U: NEGATIVE
NON-UH HIE CALCULATED LDL CHOLESTEROL: 134 MG/DL (ref 60–130)
NON-UH HIE CHOLESTEROL: 226 MG/DL (ref 100–200)
NON-UH HIE COLOR, U: NORMAL
NON-UH HIE GLUCOSE QUAL, U: NEGATIVE
NON-UH HIE HDL CHOLESTEROL: 72 MG/DL (ref 40–60)
NON-UH HIE KETONES, U: NEGATIVE
NON-UH HIE LEUKOCYTE ESTERASE, U: NEGATIVE
NON-UH HIE NITRITE, U: NEGATIVE
NON-UH HIE PH, U: 7.5 (ref 4.5–8)
NON-UH HIE PROSTATIC SPECIFIC AG SCREEN: 2.8 NG/ML (ref 0–4)
NON-UH HIE PROTEIN, U: NEGATIVE
NON-UH HIE SPECIFIC GRAVITY, U: 1.02 (ref 1–1.03)
NON-UH HIE TOTAL CHOL/HDL CHOL RATIO: 3.1
NON-UH HIE TRIGLYCERIDES: 101 MG/DL (ref 30–150)
NON-UH HIE TSH: 1.2 UIU/ML (ref 0.55–4.78)
NON-UH HIE U MICRO: NORMAL
NON-UH HIE UROBILINOGEN QUAL, U: NORMAL

## 2024-09-26 PROCEDURE — 3008F BODY MASS INDEX DOCD: CPT | Performed by: FAMILY MEDICINE

## 2024-09-26 PROCEDURE — 90471 IMMUNIZATION ADMIN: CPT | Performed by: FAMILY MEDICINE

## 2024-09-26 PROCEDURE — 99396 PREV VISIT EST AGE 40-64: CPT | Performed by: FAMILY MEDICINE

## 2024-09-26 PROCEDURE — 90673 RIV3 VACCINE NO PRESERV IM: CPT | Performed by: FAMILY MEDICINE

## 2024-09-26 PROCEDURE — 1036F TOBACCO NON-USER: CPT | Performed by: FAMILY MEDICINE

## 2024-09-26 RX ORDER — PREDNISONE 5 MG/1
7.5 TABLET ORAL DAILY
COMMUNITY
Start: 2024-08-17

## 2024-09-26 NOTE — PROGRESS NOTES
Subjective   Patient ID: Kyler Chowdhury is a 53 y.o. male who presents for Annual Exam.    Assessment/Plan     Problem List Items Addressed This Visit    None    Flu vaccine today  Discussed about diet exercise  Discussed about tetanus vaccine  Colonoscopy done- 1/23  Received Shingrix received flu vaccine  Coronary calcium score-16.88 in 12/22  Follow-up in 6 to 12 months  Come back early with any new signs and symptoms  Patient understands and agreement with plan.    HPI      53-year-old male here for physical     S/p right total knee replacement for osteoarthritis done by Dr. Thayer    Patient has a family history of DVTs in his father.  Patient recently had a right knee arthrocentesis done by Dr. Cespedes and received steroid injection couple of weeks ago     Hyperlipidemia -diet controlled  Pulmonary function showed small artery disease with significant improvement with bronchodilator and increased nitric oxide level in exhalation suggestive of eosinophilic asthma following up with pulmonary  Treadmill stress test - within normal limits    Following with rheumatology for polymyalgia rheumatica        Patient does have a history of smoking quit in January 2018 and smoked three quarters of pack every day for last 20 years     EGD in June 2018 done Showed a hiatal hernia with reflux esophagitis with 1.5 cm linear distal esophageal ulcer - taking PPI as needed visits  Stopped smoking 6 years ago and drinks alcohol  Patient was a rare smoker  Lifts heavy weight     Family history is positive for coronary artery disease grandfather around age of 50     Following up with allergy - on xolair and pulmonary for asthma  psoriasis      Allergies   Allergen Reactions    Nsaids (Non-Steroidal Anti-Inflammatory Drug) Agitation    Bee Venom Protein (Honey Bee) Swelling       Current Outpatient Medications   Medication Sig Dispense Refill    albuterol (Ventolin HFA) 90 mcg/actuation inhaler every 6 hours.      Dilcia Marmolejo  100-25 mcg/dose inhaler Inhale.      EPINEPHrine 0.3 mg/0.3 mL injection syringe USE AS DIRECTED.      fexofenadine-pseudoephedrine (Allegra-D 12 Hour)  mg 12 hr tablet Take 1 tablet by mouth 2 times a day.      predniSONE (Deltasone) 5 mg tablet Take 1.5 tablets (7.5 mg) by mouth once daily.      Skyrizi 150 mg/mL pen injector pen Inject under the skin.      hydrocortisone 2.5 % lotion twice a day.      omalizumab (Xolair) 150 mg/mL injection Inject under the skin.      pantoprazole (ProtoNix) 40 mg EC tablet Take 1 tablet (40 mg) by mouth every 12 hours. PRN      Xhance 93 mcg/actuation aerosol breath activated 1 SPRAY(S) 2 TIMES A DAY NASAL       No current facility-administered medications for this visit.       Objective   Visit Vitals  /70 (BP Location: Left arm, Patient Position: Sitting, BP Cuff Size: Adult)   Pulse 73   Ht 1.829 m (6')   Wt 88.5 kg (195 lb)   SpO2 95%   BMI 26.45 kg/m²   Smoking Status Former   BSA 2.12 m²     Physical Exam    Constitutional   General appearance: Alert and in no acute distress.   Head and Face   Head and face: Normal.     Palpation of the face and sinuses: Normal.    Eyes   Inspection of eyes: Sclera and conjunctiva were normal.    Pupil exam: Pupils were equal in size. Extraocular movements were intact.   Ears, Nose, Mouth, and Throat   Ears: Auricles: Normal.    Otoscopic examination: Tympanic membranes: Normal with no congestion and no discharge. Otic Canals: Normal without tenderness, congestion or discharge.    Hearing: Normal.     Nasal mucosa, septum, and turbinates: Normal without edema or erythema.    Lips, teeth, and gums: Normal.    Oropharynx: Normal with moist mucus membranes, no congestion. Tonsils: Normal no follicles.   Neck   Neck Exam: Appearance of the neck was normal. No neck masses observed.    Thyroid exam: Not enlarged and no palpable thyroid nodules.   Pulmonary   Respiratory assessment: No respiratory distress, normal respiratory rhythm  and effort.    Auscultation of Lungs: Clear bilateral breath sounds.   Cardiovascular   Auscultation of heart: Apical pulse normal, heart rate and rhythm normal, normal S1 and S2, no murmurs and no pericardial rub.    Carotid arteries: Pulses normal with no bruits.    Exam for edema: No peripheral edema.   Chest   Chest: Normal A_P diameter, no pulsation, no intercostal withdrawing. Trachea central.   Abdomen   Abdominal Exam: No bruits, normal bowel sounds, soft, non-tender, no abdominal mass palpated.    Liver and Spleen exam: No hepato-splenomegaly.    Examination for hernias: Normal.    Lymphatic   Palpation of lymph nodes in neck: No cervical lymphadenopathy.   Musculoskeletal   Examination of gait: Normal.    Inspection of digits and nails: No clubbing or cyanosis of the fingernails.    Inspection/palpation of joints, bones and muscles: No joint swelling. Normal movement of all extremities.    Range of Motion: Normal movement of all extremities.   Skin   Skin inspection: Normal skin color and pigmentation, normal skin turgor and no visible rash.   Neurologic   Cranial nerves: Nerves 2-12 were intact, no focal neuro defects.    Reflexes: Normal.     Sensation: Normal.     Coordination: Normal.    Psychiatric   Judgment and insight: Intact.    Orientation: Oriented to person, place, and time.    Recent and remote memory: Normal.     Mood and affect: Normal.     Genitourinary Declined.     Immunization History   Administered Date(s) Administered    Flu vaccine (IIV4), preservative free *Check age/dose* 09/24/2019, 10/05/2020, 10/11/2022    Flu vaccine, quadrivalent, recombinant, preservative free, adult (FLUBLOK) 10/05/2021, 10/17/2023    Pfizer Purple Cap SARS-CoV-2 03/26/2021, 04/16/2021, 12/30/2021    Zoster vaccine, recombinant, adult (SHINGRIX) 04/18/2023, 09/11/2023       Review of Systems    No visits with results within 4 Month(s) from this visit.   Latest known visit with results is:   Lab on 02/20/2024    Component Date Value Ref Range Status    Sedimentation Rate 02/20/2024 26 (H)  0 - 20 mm/h Final    CINDY 02/20/2024 Negative  Negative Final    Uric Acid 02/20/2024 7.6 (H)  4.0 - 7.5 mg/dL Final    Rheumatoid Factor 02/20/2024 <10  0 - 15 IU/mL Final    Citrulline Antibody, IgG 02/20/2024 <1  <3 U/mL Final       Radiology: Reviewed imaging in powerchart.  No results found.    No family history on file.  Social History     Socioeconomic History    Marital status:    Tobacco Use    Smoking status: Former     Current packs/day: 1.00     Average packs/day: 1 pack/day for 20.0 years (20.0 ttl pk-yrs)     Types: Cigarettes    Smokeless tobacco: Never   Vaping Use    Vaping status: Never Used   Substance and Sexual Activity    Alcohol use: Yes     Alcohol/week: 6.0 standard drinks of alcohol     Types: 6 Cans of beer per week    Drug use: Never    Sexual activity: Defer     Past Medical History:   Diagnosis Date    Personal history of other diseases of the circulatory system     History of cardiac murmur     Past Surgical History:   Procedure Laterality Date    KNEE SURGERY  08/01/2017    Knee Surgery Left    KNEE SURGERY  08/01/2017    Knee Surgery Right    VASECTOMY  08/01/2017    Surgery Vas Deferens Vasectomy       Charting was completed using voice recognition technology and may include unintended errors.

## 2024-12-27 LAB
NON-UH HIE APPEARANCE, U: CLEAR
NON-UH HIE APTT PATIENT: 30.8 SECONDS (ref 27–38)
NON-UH HIE BILIRUBIN, U: NEGATIVE
NON-UH HIE BLOOD, U: NEGATIVE
NON-UH HIE BUN/CREAT RATIO: 14
NON-UH HIE BUN: 14 MG/DL (ref 9–23)
NON-UH HIE CALCIUM: 9.9 MG/DL (ref 8.7–10.4)
NON-UH HIE CALCULATED OSMOLALITY: 280 MOSM/KG (ref 275–295)
NON-UH HIE CHLORIDE: 104 MMOL/L (ref 98–107)
NON-UH HIE CO2, VENOUS: 29 MMOL/L (ref 20–31)
NON-UH HIE COLOR, U: NORMAL
NON-UH HIE CREATININE: 1 MG/DL (ref 0.6–1.1)
NON-UH HIE GFR AA: >60
NON-UH HIE GLOMERULAR FILTRATION RATE: >60 ML/MIN/1.73M?
NON-UH HIE GLUCOSE QUAL, U: NEGATIVE
NON-UH HIE GLUCOSE: 96 MG/DL (ref 74–106)
NON-UH HIE HCT: 43.7 % (ref 41–52)
NON-UH HIE HGB: 14.7 G/DL (ref 13.5–17.5)
NON-UH HIE INR: 0.9
NON-UH HIE INSTR WBC ND: 6.3
NON-UH HIE K: 4.8 MMOL/L (ref 3.5–5.1)
NON-UH HIE KETONES, U: NEGATIVE
NON-UH HIE LEUKOCYTE ESTERASE, U: NEGATIVE
NON-UH HIE MCH: 30.5 PG (ref 27–34)
NON-UH HIE MCHC: 33.6 G/DL (ref 32–37)
NON-UH HIE MCV: 90.6 FL (ref 80–100)
NON-UH HIE MPV: 6.9 FL (ref 7.4–10.4)
NON-UH HIE NA: 140 MMOL/L (ref 135–145)
NON-UH HIE NITRITE, U: NEGATIVE
NON-UH HIE PH, U: 5.5 (ref 4.5–8)
NON-UH HIE PLATELET: 309 X10 (ref 150–450)
NON-UH HIE PROTEIN, U: NEGATIVE
NON-UH HIE PROTIME PATIENT: 10.6 SECONDS (ref 9.8–12.8)
NON-UH HIE RBC: 4.82 X10 (ref 4.7–6.1)
NON-UH HIE RDW: 12.9 % (ref 11.5–14.5)
NON-UH HIE SPECIFIC GRAVITY, U: 1.02 (ref 1–1.03)
NON-UH HIE U MICRO: NORMAL
NON-UH HIE UROBILINOGEN QUAL, U: NORMAL
NON-UH HIE WBC: 6.3 X10 (ref 4.5–11)

## 2025-01-22 LAB
NON-UH HIE A/G RATIO: 1
NON-UH HIE ABSOLUTE BAND CT: 0 X1000 (ref 0–0.7)
NON-UH HIE ABSOLUTE BASO CT: 0.07 X1000 (ref 0–0.2)
NON-UH HIE ABSOLUTE EOS CT: 0.5 X1000 (ref 0–0.5)
NON-UH HIE ABSOLUTE LYMPH CT: 1.37 X1000 (ref 1.2–4.8)
NON-UH HIE ABSOLUTE MONO CT: 0.86 X1000 (ref 0.1–1)
NON-UH HIE ABSOLUTE NEUTROPHIL CT: 4.18 X1000 (ref 1.4–8.8)
NON-UH HIE ABSOLUTE SEG CT: 4.18 X1000 (ref 1.4–8.8)
NON-UH HIE ALB: 3.5 G/DL (ref 3.4–5)
NON-UH HIE ALK PHOS: 70 UNIT/L (ref 45–117)
NON-UH HIE BAND %: 0 %
NON-UH HIE BASOPHIL %: 1 %
NON-UH HIE BILIRUBIN, TOTAL: 0.5 MG/DL (ref 0.3–1.2)
NON-UH HIE BUN/CREAT RATIO: 19
NON-UH HIE BUN: 19 MG/DL (ref 9–23)
NON-UH HIE CALCIUM: 10 MG/DL (ref 8.7–10.4)
NON-UH HIE CALCULATED OSMOLALITY: 283 MOSM/KG (ref 275–295)
NON-UH HIE CHLORIDE: 103 MMOL/L (ref 98–107)
NON-UH HIE CO2, VENOUS: 31 MMOL/L (ref 20–31)
NON-UH HIE CREATININE: 1 MG/DL (ref 0.6–1.1)
NON-UH HIE DIFF?: YES
NON-UH HIE DXH ACTIONS: ABNORMAL
NON-UH HIE EOSINOPHIL %: 7 %
NON-UH HIE GFR AA: >60
NON-UH HIE GLOBULIN: 3.5 G/DL
NON-UH HIE GLOMERULAR FILTRATION RATE: >60 ML/MIN/1.73M?
NON-UH HIE GLUCOSE: 85 MG/DL (ref 74–106)
NON-UH HIE GOT: 52 UNIT/L (ref 15–37)
NON-UH HIE GPT: 62 UNIT/L (ref 10–49)
NON-UH HIE HCT: 37.7 % (ref 41–52)
NON-UH HIE HGB: 12.8 G/DL (ref 13.5–17.5)
NON-UH HIE INSTR WBC: 7.2
NON-UH HIE K: 4.6 MMOL/L (ref 3.5–5.1)
NON-UH HIE LEFT SHIFT: PRESENT
NON-UH HIE LYMPHOCYTE %: 19 %
NON-UH HIE MCH: 31 PG (ref 27–34)
NON-UH HIE MCHC: 33.9 G/DL (ref 32–37)
NON-UH HIE MCV: 91.4 FL (ref 80–100)
NON-UH HIE MONOCYTE %: 12 %
NON-UH HIE MPV: 7 FL (ref 7.4–10.4)
NON-UH HIE MYELOCYTE %: 3 %
NON-UH HIE NA: 141 MMOL/L (ref 135–145)
NON-UH HIE NUCLEATED RBC: 0 /100WBC
NON-UH HIE PLATELET: 446 X10 (ref 150–450)
NON-UH HIE POLYCHROMASIA: PRESENT
NON-UH HIE RBC: 4.13 X10 (ref 4.7–6.1)
NON-UH HIE RDW: 12.6 % (ref 11.5–14.5)
NON-UH HIE SEGMENTED NEUT %: 58 %
NON-UH HIE TOTAL PROTEIN: 7 G/DL (ref 5.7–8.2)
NON-UH HIE WBC: 7.2 X10 (ref 4.5–11)

## 2025-01-25 LAB
NON-UH HIE QUANTIFERON MITOGEN MINUS NIL: 2.87 IU/ML
NON-UH HIE QUANTIFERON NIL: 0.03 IU/ML
NON-UH HIE QUANTIFERON PLUS TB1 MINUS NIL: 0 IU/ML
NON-UH HIE QUANTIFERON PLUS TB2 MINUS NIL: 0 IU/ML
NON-UH HIE QUANTIFERON TB GOLD PLUS: NEGATIVE

## 2025-03-18 ENCOUNTER — OFFICE VISIT (OUTPATIENT)
Dept: PRIMARY CARE | Facility: CLINIC | Age: 55
End: 2025-03-18
Payer: COMMERCIAL

## 2025-03-18 VITALS
HEIGHT: 72 IN | TEMPERATURE: 98.2 F | WEIGHT: 211 LBS | HEART RATE: 83 BPM | BODY MASS INDEX: 28.58 KG/M2 | OXYGEN SATURATION: 98 %

## 2025-03-18 DIAGNOSIS — J20.8 ACUTE BRONCHITIS, BACTERIAL: Primary | ICD-10-CM

## 2025-03-18 DIAGNOSIS — B96.89 ACUTE BRONCHITIS, BACTERIAL: Primary | ICD-10-CM

## 2025-03-18 PROCEDURE — 99213 OFFICE O/P EST LOW 20 MIN: CPT | Performed by: FAMILY MEDICINE

## 2025-03-18 PROCEDURE — 1036F TOBACCO NON-USER: CPT | Performed by: FAMILY MEDICINE

## 2025-03-18 PROCEDURE — 3008F BODY MASS INDEX DOCD: CPT | Performed by: FAMILY MEDICINE

## 2025-03-18 RX ORDER — BROMPHENIRAMINE MALEATE, PSEUDOEPHEDRINE HYDROCHLORIDE, AND DEXTROMETHORPHAN HYDROBROMIDE 2; 30; 10 MG/5ML; MG/5ML; MG/5ML
5 SYRUP ORAL 4 TIMES DAILY PRN
Qty: 120 ML | Refills: 0 | Status: SHIPPED | OUTPATIENT
Start: 2025-03-18 | End: 2025-03-28

## 2025-03-18 RX ORDER — ALBUTEROL SULFATE 90 UG/1
2 INHALANT RESPIRATORY (INHALATION)
Qty: 18 G | Refills: 1 | Status: SHIPPED | OUTPATIENT
Start: 2025-03-18

## 2025-03-18 RX ORDER — AZITHROMYCIN 250 MG/1
TABLET, FILM COATED ORAL
Qty: 6 TABLET | Refills: 0 | Status: SHIPPED | OUTPATIENT
Start: 2025-03-18 | End: 2025-03-23

## 2025-03-18 RX ORDER — BENZONATATE 100 MG/1
200 CAPSULE ORAL 3 TIMES DAILY PRN
Qty: 30 CAPSULE | Refills: 0 | Status: SHIPPED | OUTPATIENT
Start: 2025-03-18 | End: 2025-03-23

## 2025-03-18 NOTE — PROGRESS NOTES
Subjective   Patient ID: Kyler Chowdhury is a 54 y.o. male who presents for Sinus Problem.    Assessment/Plan     Problem List Items Addressed This Visit    None  Visit Diagnoses       Acute bronchitis, bacterial    -  Primary    Relevant Medications    albuterol (Ventolin HFA) 90 mcg/actuation inhaler    benzonatate (Tessalon) 100 mg capsule    dextromethorphan-guaifenesin (Mucinex DM)  mg 12 hr tablet    brompheniramine-pseudoeph-DM 2-30-10 mg/5 mL syrup    azithromycin (Zithromax) 250 mg tablet            HPI    54-year-old male complaining of cough congestion runny nose sinus pressure phlegm production going on more than a week using over-the-counter medication without much help    No fever chills no nausea vomiting no dizziness confusion no vomiting or diarrhea    Patient is using over-the-counter Mucinex and other medications but phlegm production is still present    Will consider above medication  Bronchitis  Stay hydrated risk-benefit discussed  Call us if symptoms are worsening    Allergies   Allergen Reactions    Nsaids (Non-Steroidal Anti-Inflammatory Drug) Agitation    Bee Venom Protein (Honey Bee) Swelling       Current Outpatient Medications   Medication Sig Dispense Refill    Breo Ellipta 100-25 mcg/dose inhaler Inhale.      EPINEPHrine 0.3 mg/0.3 mL injection syringe USE AS DIRECTED.      fexofenadine-pseudoephedrine (Allegra-D 12 Hour)  mg 12 hr tablet Take 1 tablet by mouth 2 times a day.      Skyrizi 150 mg/mL pen injector pen Inject under the skin.      albuterol (Ventolin HFA) 90 mcg/actuation inhaler Inhale 2 puffs 4 times a day. 18 g 1    azithromycin (Zithromax) 250 mg tablet Take 2 tablets (500 mg) by mouth once daily for 1 day, THEN 1 tablet (250 mg) once daily for 4 days. Take 2 tabs (500 mg) by mouth today, than 1 daily for 4 days.. 6 tablet 0    benzonatate (Tessalon) 100 mg capsule Take 2 capsules (200 mg) by mouth 3 times a day as needed for cough for up to 5 days. Do not  crush or chew. 30 capsule 0    brompheniramine-pseudoeph-DM 2-30-10 mg/5 mL syrup Take 5 mL by mouth 4 times a day as needed for congestion for up to 10 days. 120 mL 0    dextromethorphan-guaifenesin (Mucinex DM)  mg 12 hr tablet Take 1 tablet by mouth every 12 hours for 10 days. Do not crush, chew, or split. 20 tablet 0     No current facility-administered medications for this visit.       Objective   Visit Vitals  Pulse 83   Temp 36.8 °C (98.2 °F)   Ht 1.829 m (6')   Wt 95.7 kg (211 lb)   SpO2 98%   BMI 28.62 kg/m²   Smoking Status Former   BSA 2.21 m²     Physical Exam  Constitutional:       General: He is not in acute distress.     Appearance: Normal appearance.   HENT:      Head: Normocephalic and atraumatic.      Nose: Nose normal.   Eyes:      Extraocular Movements: Extraocular movements intact.      Conjunctiva/sclera: Conjunctivae normal.   Cardiovascular:      Rate and Rhythm: Normal rate and regular rhythm.   Pulmonary:      Effort: Pulmonary effort is normal.      Breath sounds: Normal breath sounds.   Skin:     General: Skin is warm.   Neurological:      Mental Status: He is alert and oriented to person, place, and time.   Psychiatric:         Mood and Affect: Mood normal.         Behavior: Behavior normal.     Bilateral nasal mucosa mild edema present  Oropharynx within normal limits  No maxillary sinus tenderness  No cervical lymphadenopathy  Immunization History   Administered Date(s) Administered    Flu vaccine (IIV4), preservative free *Check age/dose* 09/24/2019, 10/05/2020, 10/11/2022    Flu vaccine, quadrivalent, recombinant, preservative free, adult (FLUBLOK) 10/05/2021, 10/17/2023    Flu vaccine, trivalent, preservative free, no egg protein, age 18y+ (Flublok) 09/26/2024    Pfizer Purple Cap SARS-CoV-2 03/26/2021, 04/16/2021, 12/30/2021    Zoster vaccine, recombinant, adult (SHINGRIX) 04/18/2023, 09/11/2023       Review of Systems    Orders Only on 01/25/2025   Component Date Value Ref  Range Status    [facility] QuantiFERON NIL 01/25/2025 0.03  IU/mL Final    [facility] QuantiFeron TB Gold Plus 01/25/2025 Negative  Negative Final    [facility] Quantiferon Plus TB1 mi* 01/25/2025 0.00  <=0.34 IU/mL Final    [facility] QuantiFERON Mitogen min* 01/25/2025 2.87  IU/mL Final    [facility] Quantiferon Plus TB2 mi* 01/25/2025 0.00  <=0.34 IU/mL Final   Orders Only on 01/22/2025   Component Date Value Ref Range Status    [facility] Glomerular Filtration R* 01/22/2025 >60  mL/min/1.73m? Final    [facility] Calculated Osmolality 01/22/2025 283  275 - 295 mOsm/kg Final    [facility] K 01/22/2025 4.6  3.5 - 5.1 mmol/L Final    [facility] Globulin 01/22/2025 3.5  g/dL Final    [facility] BUN 01/22/2025 19  9 - 23 mg/dL Final    [facility] Calcium 01/22/2025 10.0  8.7 - 10.4 mg/dL Final    [facility] GOT 01/22/2025 52 (H)  15 - 37 unit/L Final    [facility] ALB 01/22/2025 3.5  3.4 - 5.0 g/dL Final    [facility] Glucose 01/22/2025 85  74 - 106 mg/dL Final    [facility] GFR AA 01/22/2025 >60   Final    [facility] Bilirubin, Total 01/22/2025 0.50  0.30 - 1.20 mg/dL Final    [facility] Chloride 01/22/2025 103  98 - 107 mmol/L Final    [facility] A/G Ratio 01/22/2025 1.0   Final    [facility] BUN/Creat Ratio 01/22/2025 19.0   Final    [facility] Na 01/22/2025 141  135 - 145 mmol/L Final    [facility] GPT 01/22/2025 62 (H)  10 - 49 unit/L Final    [facility] Alk Phos 01/22/2025 70  45 - 117 unit/L Final    [facility] Creatinine 01/22/2025 1.0  0.6 - 1.1 mg/dL Final    [facility] Total Protein 01/22/2025 7.0  5.7 - 8.2 g/dL Final    [facility] CO2, venous 01/22/2025 31.0  20.0 - 31.0 mmol/L Final    [facility] HCT 01/22/2025 37.7 (L)  41.0 - 52.0 % Final    [facility] RBC 01/22/2025 4.13 (L)  4.70 - 6.10 x10 Final    [facility] Platelet 01/22/2025 446  150 - 450 x10 Final    [facility] MCHC 01/22/2025 33.9  32.0 - 37.0 g/dL Final    [facility] DIFF? 01/22/2025 Yes   Final    [facility] Instr WBC 01/22/2025 7.2   Final     [facility] MCV 01/22/2025 91.4  80.0 - 100.0 fL Final    [facility] HGB 01/22/2025 12.8 (L)  13.5 - 17.5 g/dL Final    [facility] MPV 01/22/2025 7.0 (L)  7.4 - 10.4 fL Final    [facility] WBC 01/22/2025 7.2  4.5 - 11.0 x10 Final    [facility] RDW 01/22/2025 12.6  11.5 - 14.5 % Final    [facility] MCH 01/22/2025 31.0  27.0 - 34.0 pg Final    [facility] DxH Actions 01/22/2025 See Notes (A)   Final    [facility] Nucleated RBC 01/22/2025 0  /100WBC Final    [facility] Absolute Band Ct 01/22/2025 0.00  0.00 - 0.70 x1000 Final    [facility] Monocyte % 01/22/2025 12  % Final    [facility] Absolute Baso Ct 01/22/2025 0.07  0.00 - 0.20 x1000 Final    [facility] Band % 01/22/2025 0  % Final    [facility] Polychromasia 01/22/2025 Present   Final    [facility] Absolute Mono Ct 01/22/2025 0.86  0.10 - 1.00 x1000 Final    [facility] Myelocyte % 01/22/2025 3  % Final    [facility] Absolute Neutrophil Ct 01/22/2025 4.18  1.40 - 8.80 x1000 Final    [facility] Eosinophil % 01/22/2025 7  % Final    [facility] Lymphocyte % 01/22/2025 19  % Final    [facility] Absolute Seg Ct 01/22/2025 4.18  1.40 - 8.80 x1000 Final    [facility] Absolute Eos Ct 01/22/2025 0.50  0.00 - 0.50 x1000 Final    [facility] Segmented Neut % 01/22/2025 58  % Final    [facility] Left Shift 01/22/2025 Present   Final    [facility] Absolute Lymph Ct 01/22/2025 1.37  1.20 - 4.80 x1000 Final    [facility] Basophil % 01/22/2025 1  % Final   Orders Only on 12/27/2024   Component Date Value Ref Range Status    [facility] Platelet 12/27/2024 309  150 - 450 x10 Final    [facility] HCT 12/27/2024 43.7  41.0 - 52.0 % Final    [facility] MCHC 12/27/2024 33.6  32.0 - 37.0 g/dL Final    [facility] HGB 12/27/2024 14.7  13.5 - 17.5 g/dL Final    [facility] WBC 12/27/2024 6.3  4.5 - 11.0 x10 Final    [facility] MCV 12/27/2024 90.6  80.0 - 100.0 fL Final    [facility] RDW 12/27/2024 12.9  11.5 - 14.5 % Final    [facility] MCH 12/27/2024 30.5  27.0 - 34.0 pg Final    [facility]  RBC 12/27/2024 4.82  4.70 - 6.10 x10 Final    [facility] MPV 12/27/2024 6.9 (L)  7.4 - 10.4 fL Final    [facility] Instr WBC ND 12/27/2024 6.3   Final    [facility] APTT Patient 12/27/2024 30.8  27.0 - 38.0 seconds Final    [facility] INR 12/27/2024 0.9   Final    [facility] Protime Patient 12/27/2024 10.6  9.8 - 12.8 seconds Final    [facility] GFR AA 12/27/2024 >60   Final    [facility] CO2, venous 12/27/2024 29.0  20.0 - 31.0 mmol/L Final    [facility] K 12/27/2024 4.8  3.5 - 5.1 mmol/L Final    [facility] Calculated Osmolality 12/27/2024 280  275 - 295 mOsm/kg Final    [facility] Calcium 12/27/2024 9.9  8.7 - 10.4 mg/dL Final    [facility] BUN 12/27/2024 14  9 - 23 mg/dL Final    [facility] Glucose 12/27/2024 96  74 - 106 mg/dL Final    [facility] Chloride 12/27/2024 104  98 - 107 mmol/L Final    [facility] Glomerular Filtration R* 12/27/2024 >60  mL/min/1.73m? Final    [facility] Na 12/27/2024 140  135 - 145 mmol/L Final    [facility] BUN/Creat Ratio 12/27/2024 14.0   Final    [facility] Creatinine 12/27/2024 1.0  0.6 - 1.1 mg/dL Final    [facility] Color, U 12/27/2024 Light-Yellow  Yellow Final    [facility] Urobilinogen Qual, U 12/27/2024 < 2 mg/dl  < 2 mg/dl Final    [facility] Bilirubin, U 12/27/2024 Negative  Negative Final    [facility] Specific Gravity, U 12/27/2024 1.023  1.001 - 1.035 Final    [facility] Ketones, U 12/27/2024 Negative  Negative Final    [facility] Blood, U 12/27/2024 Negative  Negative Final    [facility] Protein, U 12/27/2024 Negative  Negative Final    [facility] Leukocyte Esterase, U 12/27/2024 Negative  Negative Final    [facility] pH, U 12/27/2024 5.5  4.5 - 8.0 Final    [facility] Appearance, U 12/27/2024 Clear  Clear Final    [facility] Nitrite, U 12/27/2024 Negative  Negative Final    [facility] Glucose Qual, U 12/27/2024 Negative  Negative Final    [facility] U MICRO 12/27/2024 Not Indicated   Final       Radiology: Reviewed imaging in powerchart.  No results found.    No  family history on file.  Social History     Socioeconomic History    Marital status:    Tobacco Use    Smoking status: Former     Current packs/day: 1.00     Average packs/day: 1 pack/day for 20.0 years (20.0 ttl pk-yrs)     Types: Cigarettes    Smokeless tobacco: Never   Vaping Use    Vaping status: Never Used   Substance and Sexual Activity    Alcohol use: Yes     Alcohol/week: 6.0 standard drinks of alcohol     Types: 6 Cans of beer per week    Drug use: Never    Sexual activity: Defer     Past Medical History:   Diagnosis Date    Personal history of other diseases of the circulatory system     History of cardiac murmur     Past Surgical History:   Procedure Laterality Date    KNEE SURGERY  08/01/2017    Knee Surgery Left    KNEE SURGERY  08/01/2017    Knee Surgery Right    VASECTOMY  08/01/2017    Surgery Vas Deferens Vasectomy       Charting was completed using voice recognition technology and may include unintended errors.